# Patient Record
(demographics unavailable — no encounter records)

---

## 2024-11-05 NOTE — ASSESSMENT
[FreeTextEntry1] : Venipuncture done in our office, Labs sent out.  Medrol Dosepak given and x-ray of the left shoulder and sonogram ordered.  Consider PT/orthopedic evaluation pending imaging results.  Patient will return to the office for CPE applicable.   Dr. Nugent was present in office building while I examined patient

## 2024-11-05 NOTE — HISTORY OF PRESENT ILLNESS
[FreeTextEntry8] : Patient presents complaining of 1.  Would like STD screening, asymptomatic, no known exposure 2.  Complaining of left shoulder pain for 2 weeks, feels like she pulled something or slept on it wrong.  Patient had no injury or trauma.  Patient has been icing and applying OTC topical medication without benefit.  Patient states she is having difficulty with certain overhead movements.

## 2025-02-04 NOTE — HISTORY OF PRESENT ILLNESS
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

## 2025-02-04 NOTE — HEALTH RISK ASSESSMENT
[Good] : ~his/her~  mood as  good [No] : No [No falls in past year] : Patient reported no falls in the past year [0] : 2) Feeling down, depressed, or hopeless: Not at all (0) [Former] : Former [With Family] : lives with family [Employed] : employed [High School] : high school [Single] : single [Sexually Active] : sexually active [Feels Safe at Home] : Feels safe at home [Fully functional (bathing, dressing, toileting, transferring, walking, feeding)] : Fully functional (bathing, dressing, toileting, transferring, walking, feeding) [Fully functional (using the telephone, shopping, preparing meals, housekeeping, doing laundry, using] : Fully functional and needs no help or supervision to perform IADLs (using the telephone, shopping, preparing meals, housekeeping, doing laundry, using transportation, managing medications and managing finances) [Fair] : ~his/her~ current health as fair  [Yes] : Yes [2 - 4 times a month (2 pts)] : 2-4 times a month (2 points) [3 or 4 (1 pt)] : 3 or 4  (1 point) [Never (0 pts)] : Never (0 points) [Current] : Current [Alone] : lives alone [Audit-CScore] : 3 points [de-identified] : occ marijuana [de-identified] : +VAPE [Seat Belt] : does not use seat belt [Sunscreen] : does not use sunscreen [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

## 2025-02-04 NOTE — HEALTH RISK ASSESSMENT
[Good] : ~his/her~  mood as  good [No] : No [No falls in past year] : Patient reported no falls in the past year [0] : 2) Feeling down, depressed, or hopeless: Not at all (0) [Former] : Former [With Family] : lives with family [Employed] : employed [High School] : high school [Single] : single [Sexually Active] : sexually active [Feels Safe at Home] : Feels safe at home [Fully functional (bathing, dressing, toileting, transferring, walking, feeding)] : Fully functional (bathing, dressing, toileting, transferring, walking, feeding) [Fully functional (using the telephone, shopping, preparing meals, housekeeping, doing laundry, using] : Fully functional and needs no help or supervision to perform IADLs (using the telephone, shopping, preparing meals, housekeeping, doing laundry, using transportation, managing medications and managing finances) [Fair] : ~his/her~ current health as fair  [Yes] : Yes [2 - 4 times a month (2 pts)] : 2-4 times a month (2 points) [3 or 4 (1 pt)] : 3 or 4  (1 point) [Never (0 pts)] : Never (0 points) [Current] : Current [Alone] : lives alone [Audit-CScore] : 3 points [de-identified] : occ marijuana [de-identified] : +VAPE [Seat Belt] : does not use seat belt [Sunscreen] : does not use sunscreen [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

## 2025-02-04 NOTE — ASSESSMENT
[FreeTextEntry1] : Told patient to call gastro and inform them that she is not taking Protonix.  Venipuncture done in our office, Labs sent out.Patient advised to diet/exercise and specialists followup. Counseled patient on cigarettes/alcohol/drugs/safe sex practices and encouraged self breast exams. Encouraged routine follow up with dentist /GYN.RTO Q year/prn   TDAP booster discussed with patient Flu shot=declines covid vaccines + received Specialists include 1.GYN-Dr. Mchugh/Alyse 2.Dentist-F[facility]RIZIO 3.GI-Dr. Brown 4.ENT=Dr. Landis 5.Neuro=Dr. Dodge +Vape MG= Rx given Scope=refer at age 46 y/o Endoscopy 1/2023 with follow-up in 3 years

## 2025-02-04 NOTE — PHYSICAL EXAM
[No Acute Distress] : no acute distress [Normal Sclera/Conjunctiva] : normal sclera/conjunctiva [PERRL] : pupils equal round and reactive to light [EOMI] : extraocular movements intact [Normal Outer Ear/Nose] : the outer ears and nose were normal in appearance [Normal Oropharynx] : the oropharynx was normal [Normal TMs] : both tympanic membranes were normal [Normal Nasal Mucosa] : the nasal mucosa was normal [No Lymphadenopathy] : no lymphadenopathy [Supple] : supple [No Respiratory Distress] : no respiratory distress  [Clear to Auscultation] : lungs were clear to auscultation bilaterally [Normal Rate] : normal rate  [Regular Rhythm] : with a regular rhythm [Pedal Pulses Present] : the pedal pulses are present [No Edema] : there was no peripheral edema [No Extremity Clubbing/Cyanosis] : no extremity clubbing/cyanosis [Normal Appearance] : normal in appearance [No Nipple Discharge] : no nipple discharge [Soft] : abdomen soft [Non Tender] : non-tender [Non-distended] : non-distended [No Masses] : no abdominal mass palpated [Normal Bowel Sounds] : normal bowel sounds [No CVA Tenderness] : no CVA  tenderness [No Spinal Tenderness] : no spinal tenderness [No Joint Swelling] : no joint swelling [Grossly Normal Strength/Tone] : grossly normal strength/tone [No Rash] : no rash [No Focal Deficits] : no focal deficits [Normal Affect] : the affect was normal [Normal Mood] : the mood was normal [de-identified] : [facility]

## 2025-02-04 NOTE — ASSESSMENT
[FreeTextEntry1] : Told patient to call gastro and inform them that she is not taking Protonix.  Venipuncture done in our office, Labs sent out.Patient advised to diet/exercise and specialists followup. Counseled patient on cigarettes/alcohol/drugs/safe sex practices and encouraged self breast exams. Encouraged routine follow up with dentist /GYN.RTO Q year/prn   TDAP booster discussed with patient Flu shot=declines covid vaccines + received Specialists include 1.GYN-Dr. Mchugh/Alyse 2.Dentist-[facility] 3.GI-Dr. Brown 4.ENT=Dr. Landis 5.Neuro=Dr. Dodge +Vape MG= Rx given Scope=refer at age 44 y/o Endoscopy 1/2023 with follow-up in 3 years

## 2025-02-04 NOTE — PHYSICAL EXAM
[No Acute Distress] : no acute distress [Normal Sclera/Conjunctiva] : normal sclera/conjunctiva [PERRL] : pupils equal round and reactive to light [EOMI] : extraocular movements intact [Normal Oropharynx] : the oropharynx was normal [Normal Outer Ear/Nose] : the outer ears and nose were normal in appearance [Normal TMs] : both tympanic membranes were normal [Normal Nasal Mucosa] : the nasal mucosa was normal [No Lymphadenopathy] : no lymphadenopathy [Supple] : supple [No Respiratory Distress] : no respiratory distress  [Clear to Auscultation] : lungs were clear to auscultation bilaterally [Normal Rate] : normal rate  [Regular Rhythm] : with a regular rhythm [Pedal Pulses Present] : the pedal pulses are present [No Edema] : there was no peripheral edema [No Extremity Clubbing/Cyanosis] : no extremity clubbing/cyanosis [Normal Appearance] : normal in appearance [No Nipple Discharge] : no nipple discharge [Soft] : abdomen soft [Non Tender] : non-tender [Non-distended] : non-distended [No Masses] : no abdominal mass palpated [Normal Bowel Sounds] : normal bowel sounds [No CVA Tenderness] : no CVA  tenderness [No Spinal Tenderness] : no spinal tenderness [No Joint Swelling] : no joint swelling [Grossly Normal Strength/Tone] : grossly normal strength/tone [No Rash] : no rash [No Focal Deficits] : no focal deficits [Normal Affect] : the affect was normal [Normal Mood] : the mood was normal [de-identified] : [facility]

## 2025-02-04 NOTE — COUNSELING
[Benefits of weight loss discussed] : Benefits of weight loss discussed [Needs reinforcement, provided] : Patient needs reinforcement on understanding of disease, goals and obesity follow-up plan; reinforcement was provided [Yes] : Risk of tobacco use and health benefits of smoking cessation discussed: Yes

## 2025-05-15 NOTE — ASSESSMENT
[FreeTextEntry1] : Patient prescribed ~  ~  .Patient advised to rest/increase fluids and use supportive therapy. Patient will call if symptoms persist or worsen and return to the office as scheduled for regular followup.   Dr. Nugent was present in office building while I examined patient